# Patient Record
Sex: FEMALE | Race: OTHER | NOT HISPANIC OR LATINO
[De-identification: names, ages, dates, MRNs, and addresses within clinical notes are randomized per-mention and may not be internally consistent; named-entity substitution may affect disease eponyms.]

---

## 2019-05-16 ENCOUNTER — APPOINTMENT (OUTPATIENT)
Dept: OPHTHALMOLOGY | Facility: CLINIC | Age: 69
End: 2019-05-16
Payer: MEDICARE

## 2019-05-16 DIAGNOSIS — H47.092: ICD-10-CM

## 2019-05-16 DIAGNOSIS — H35.372 PUCKERING OF MACULA, LEFT EYE: ICD-10-CM

## 2019-05-16 DIAGNOSIS — H34.8322 TRIBUTARY (BRANCH) RETINAL VEIN OCCLUSION, LEFT EYE, STABLE: ICD-10-CM

## 2019-05-16 DIAGNOSIS — H52.223 REGULAR ASTIGMATISM, BILATERAL: ICD-10-CM

## 2019-05-16 DIAGNOSIS — H25.13 AGE-RELATED NUCLEAR CATARACT, BILATERAL: ICD-10-CM

## 2019-05-16 PROCEDURE — 92004 COMPRE OPH EXAM NEW PT 1/>: CPT

## 2019-05-16 PROCEDURE — 92136 OPHTHALMIC BIOMETRY: CPT

## 2019-05-16 PROCEDURE — 92134 CPTRZ OPH DX IMG PST SGM RTA: CPT

## 2019-05-29 ENCOUNTER — APPOINTMENT (OUTPATIENT)
Dept: OPHTHALMOLOGY | Facility: CLINIC | Age: 69
End: 2019-05-29
Payer: MEDICARE

## 2019-05-29 PROCEDURE — 92136 OPHTHALMIC BIOMETRY: CPT | Mod: NC

## 2019-06-12 ENCOUNTER — APPOINTMENT (OUTPATIENT)
Dept: OPHTHALMOLOGY | Facility: AMBULATORY MEDICAL SERVICES | Age: 69
End: 2019-06-12

## 2019-06-13 ENCOUNTER — APPOINTMENT (OUTPATIENT)
Dept: OPHTHALMOLOGY | Facility: CLINIC | Age: 69
End: 2019-06-13

## 2019-06-18 ENCOUNTER — APPOINTMENT (OUTPATIENT)
Dept: OPHTHALMOLOGY | Facility: CLINIC | Age: 69
End: 2019-06-18

## 2019-08-10 ENCOUNTER — EMERGENCY (EMERGENCY)
Facility: HOSPITAL | Age: 69
LOS: 1 days | Discharge: HOME | End: 2019-08-10
Attending: EMERGENCY MEDICINE
Payer: MEDICARE

## 2019-08-10 VITALS
RESPIRATION RATE: 16 BRPM | SYSTOLIC BLOOD PRESSURE: 150 MMHG | TEMPERATURE: 98 F | DIASTOLIC BLOOD PRESSURE: 82 MMHG | OXYGEN SATURATION: 99 % | HEART RATE: 55 BPM

## 2019-08-10 VITALS
RESPIRATION RATE: 17 BRPM | OXYGEN SATURATION: 99 % | DIASTOLIC BLOOD PRESSURE: 78 MMHG | HEIGHT: 63 IN | TEMPERATURE: 98 F | HEART RATE: 71 BPM | SYSTOLIC BLOOD PRESSURE: 160 MMHG | WEIGHT: 138.01 LBS

## 2019-08-10 PROCEDURE — 99283 EMERGENCY DEPT VISIT LOW MDM: CPT

## 2019-08-10 RX ORDER — DIPHENHYDRAMINE HCL 50 MG
50 CAPSULE ORAL ONCE
Refills: 0 | Status: COMPLETED | OUTPATIENT
Start: 2019-08-10 | End: 2019-08-10

## 2019-08-10 RX ORDER — KETOTIFEN FUMARATE 0.34 MG/ML
1 SOLUTION OPHTHALMIC ONCE
Refills: 0 | Status: COMPLETED | OUTPATIENT
Start: 2019-08-10 | End: 2019-08-10

## 2019-08-10 RX ORDER — MOXIFLOXACIN HCL 0.5 %
1 DROPS OPHTHALMIC (EYE) ONCE
Refills: 0 | Status: COMPLETED | OUTPATIENT
Start: 2019-08-10 | End: 2019-08-10

## 2019-08-10 RX ADMIN — KETOTIFEN FUMARATE 1 DROP(S): 0.34 SOLUTION OPHTHALMIC at 22:10

## 2019-08-10 RX ADMIN — Medication 50 MILLIGRAM(S): at 21:43

## 2019-08-10 RX ADMIN — Medication 1 DROP(S): at 22:10

## 2019-08-10 NOTE — ED PROVIDER NOTE - NSFOLLOWUPCLINICS_GEN_ALL_ED_FT
[facility]  Ophthalmology  [address] [place] [address] [address]  [place] [address]  Phone: [phone]  Fax:   Follow Up Time: 1-3 Days [facility]  Ophthalmology  [address] [place] [address] [address]  [place] [address]  Phone: [phone]  Fax:   Follow Up Time: 1-3 Days

## 2019-08-10 NOTE — ED PROVIDER NOTE - OBJECTIVE STATEMENT
67yo F with PMH HTN, HLD, GERD, L lower visual field loss 2/2 cva, presenting with b/l eye discharge and edema. Pt reports she took her contacts out wrong 4 days ago, had b/l eye pain and pruritis. Went to her optho yesterday and dx with b/l corneal abrasions and rx'ed polytrim drops. Pt reports she used drops 3x and today noted swelling around b/l eyes along with yellow discharge. No new eye pain. No new visual changes. Tried to reach optho today but was unable to so came to ED. Denies fever/chills, throat pain/swelling, n/v, wheezing. No previously known allergies.     Optho Dr. Wolf Blanchard / Dr. Vasquez 67yo F with PMH HTN, HLD, GERD, L lower visual field loss 2/2 cva, b/l cataracts (scheduled for upcoming surgery) presenting with b/l eye discharge and edema. Pt reports she took her contacts out wrong 4 days ago, had b/l eye pain and pruritis. Went to her optho yesterday and dx with b/l corneal abrasions and rx'ed polytrim drops. Pt reports she used drops 3x and today noted swelling around b/l eyes along with yellow discharge. No new eye pain. No new visual changes. Tried to reach optho today but was unable to so came to ED. Denies fever/chills, throat pain/swelling, n/v, wheezing, rash. No previously known allergies.     Optho Dr. Wolf Blanchard / Dr. Vasquez 67yo F with PMH HTN, HLD, GERD, L lower visual field loss 2/2 cva, b/l cataracts (scheduled for upcoming surgery) presenting with b/l eye discharge and edema. Pt reports she took her contacts out wrong 4 days ago, had b/l eye pain and pruritis. Went to her optho yesterday and dx with b/l corneal abrasions and rx'ed polytrim drops. Pt reports she used drops 3x and today noted swelling around b/l eyes along with yellow discharge. No new eye pain. No new visual changes. Tried to reach optho today but was unable to so came to ED. Denies fever/chills, throat pain/swelling, n/v, wheezing, rash. No previously known allergies.        Optho Dr. Wolf Blanchard / Dr. Christina Howell - pt seen in mid19 - agree with above - pt c/o eyelid crusting, redness, swelling and edema around eyes after usind polytrim eye drops Wed. Last used noon today.  + blurred vision with much tearing.  pt received new contact lenses and removed them and had eye pain.  Was seen by her ophtho and rx Abx gtts.

## 2019-08-10 NOTE — ED PROVIDER NOTE - PHYSICAL EXAMINATION
Attn -  alert, bilat eyelid swelling and crusted eyelashes, injected sclera and conjunctiva with sl. chemosis. PERRL 2 mm, visual acuity 20/40 OD and 20/30 OS.  Fundi - no hemorrhage or edema. edema periorbital area.

## 2019-08-10 NOTE — ED PROVIDER NOTE - CLINICAL SUMMARY MEDICAL DECISION MAKING FREE TEXT BOX
Attn - corneal abrasion and allergic rxn to Abx gtts. - stop drops.  Rx Ofloxacin/Moxifloxacin,  ophthal antihistamine.  f/u with her ophtho or [facility] ophtho clinic monday.

## 2019-08-10 NOTE — ED PROVIDER NOTE - BILATERAL EYES
CONJUNCTIVA ERYTHEMA/+ b/l upper and lower eyelid edema; EOMI b/l; PERRL b/l + b/l upper and lower eyelid edema; + minimal pale yellow discharge noted b/l; EOMI b/l; PERRL b/l/CONJUNCTIVA ERYTHEMA

## 2019-08-10 NOTE — ED PROVIDER NOTE - NSFOLLOWUPINSTRUCTIONS_ED_ALL_ED_FT
Ketotifen (ophthalmic): Instill 1 drop into the affected eye(s) twice daily every 8 to 12 hours (maximum: do not exceed 2 applications/day) Take benadryl 25-50mg every 6 hours as needed for eye itching/swelling.     Stop using Polytrim drops.    Start Moxifloxacin eye drops: 1 drop into affected eye(s) 2 times daily for 7 days    Use Ketotifen (ophthalmic) for eye itching: Instill 1 drop into the affected eye(s) twice daily every 8 to 12 hours (maximum: do not exceed 2 applications/day)    Follow up with your ophthalmologist upon discharge.  [facility] information provided.     Return to ER for any new/worsening eye swelling/pain, throat swelling, trouble breathing/swallowing, or any other concerns.

## 2019-08-10 NOTE — ED ADULT TRIAGE NOTE - CHIEF COMPLAINT QUOTE
Patient c/o bilateral eyes redness, burning, discharge and tearing since this am. after taking polymyxin B Sulfate.

## 2019-08-10 NOTE — ED ADULT NURSE NOTE - OBJECTIVE STATEMENT
67 y/o female with PMH HTN, HCL, left eye stroke (2007) residual lower visual field loss, arrives to ED with c/o eye redness, itchiness and swelling eyelids. Patient reports she was diagnosed with corneal BL after using eye contacts, was prescribed polymixin B drops, took 3 drops total over course of two days and woke up with symptoms today. Patient states "I am having an allergic reaction to eye drops". States she read the literature on back of prescription and is experiencing side effects. Patient has no new visual changes, PERRLA 3mm BL. No headache, dizziness, shortness of breath, chest pain, abdomen pain, n/v/d. No fever/chills or urinary symptoms. Patient states she was itching area, could have caused increased inflammation to eyelid. Patient also stating having yellowish discharge from BL eyes.

## 2019-08-10 NOTE — ED ADULT NURSE NOTE - NSIMPLEMENTINTERV_GEN_ALL_ED
Implemented All Universal Safety Interventions:  [place] to call system. Call bell, personal items and telephone within reach. Instruct patient to call for assistance. Room bathroom lighting operational. Non-slip footwear when patient is off stretcher. Physically safe environment: no spills, clutter or unnecessary equipment. Stretcher in lowest position, wheels locked, appropriate side rails in place.

## 2020-11-04 ENCOUNTER — RESULT REVIEW (OUTPATIENT)
Age: 70
End: 2020-11-04

## 2021-08-03 DIAGNOSIS — R92.2 INCONCLUSIVE MAMMOGRAM: ICD-10-CM

## 2021-11-05 ENCOUNTER — APPOINTMENT (OUTPATIENT)
Dept: OBGYN | Facility: CLINIC | Age: 71
End: 2021-11-05
Payer: MEDICARE

## 2021-11-05 VITALS
WEIGHT: 145 LBS | DIASTOLIC BLOOD PRESSURE: 82 MMHG | HEIGHT: 62 IN | BODY MASS INDEX: 26.68 KG/M2 | SYSTOLIC BLOOD PRESSURE: 120 MMHG

## 2021-11-05 PROCEDURE — G0101: CPT | Mod: GA

## 2021-11-05 PROCEDURE — G0328 FECAL BLOOD SCRN IMMUNOASSAY: CPT | Mod: QW

## 2022-11-08 ENCOUNTER — APPOINTMENT (OUTPATIENT)
Dept: OBGYN | Facility: CLINIC | Age: 72
End: 2022-11-08

## 2022-11-08 VITALS
DIASTOLIC BLOOD PRESSURE: 83 MMHG | WEIGHT: 150 LBS | SYSTOLIC BLOOD PRESSURE: 128 MMHG | BODY MASS INDEX: 27.6 KG/M2 | HEIGHT: 62 IN

## 2022-11-08 DIAGNOSIS — E28.39 OTHER PRIMARY OVARIAN FAILURE: ICD-10-CM

## 2022-11-08 DIAGNOSIS — N90.5 ATROPHY OF VULVA: ICD-10-CM

## 2022-11-08 DIAGNOSIS — Z01.411 ENCOUNTER FOR GYNECOLOGICAL EXAMINATION (GENERAL) (ROUTINE) WITH ABNORMAL FINDINGS: ICD-10-CM

## 2022-11-08 DIAGNOSIS — M81.0 AGE-RELATED OSTEOPOROSIS W/OUT CURRENT PATHOLOGICAL FRACTURE: ICD-10-CM

## 2022-11-08 PROCEDURE — G0101: CPT

## 2022-11-08 PROCEDURE — G0328 FECAL BLOOD SCRN IMMUNOASSAY: CPT | Mod: QW

## 2022-11-10 LAB — HPV HIGH+LOW RISK DNA PNL CVX: NOT DETECTED

## 2022-11-14 LAB — CYTOLOGY CVX/VAG DOC THIN PREP: ABNORMAL

## 2023-06-27 DIAGNOSIS — Z12.39 ENCOUNTER FOR OTHER SCREENING FOR MALIGNANT NEOPLASM OF BREAST: ICD-10-CM

## 2023-11-16 ENCOUNTER — APPOINTMENT (OUTPATIENT)
Dept: OBGYN | Facility: CLINIC | Age: 73
End: 2023-11-16
Payer: MEDICARE

## 2023-11-16 VITALS
BODY MASS INDEX: 26.87 KG/M2 | DIASTOLIC BLOOD PRESSURE: 77 MMHG | HEIGHT: 62 IN | SYSTOLIC BLOOD PRESSURE: 130 MMHG | WEIGHT: 146 LBS

## 2023-11-16 PROCEDURE — G0328 FECAL BLOOD SCRN IMMUNOASSAY: CPT | Mod: GA,QW

## 2023-11-16 PROCEDURE — G0101: CPT | Mod: GA

## 2024-07-29 DIAGNOSIS — Z12.31 ENCOUNTER FOR SCREENING MAMMOGRAM FOR MALIGNANT NEOPLASM OF BREAST: ICD-10-CM

## 2024-12-06 ENCOUNTER — APPOINTMENT (OUTPATIENT)
Dept: OBGYN | Facility: CLINIC | Age: 74
End: 2024-12-06

## 2024-12-06 ENCOUNTER — NON-APPOINTMENT (OUTPATIENT)
Age: 74
End: 2024-12-06

## 2024-12-06 VITALS
DIASTOLIC BLOOD PRESSURE: 74 MMHG | HEIGHT: 62 IN | SYSTOLIC BLOOD PRESSURE: 133 MMHG | BODY MASS INDEX: 27.6 KG/M2 | WEIGHT: 150 LBS

## 2024-12-06 DIAGNOSIS — E28.39 OTHER PRIMARY OVARIAN FAILURE: ICD-10-CM

## 2024-12-06 DIAGNOSIS — Z13.31 ENCOUNTER FOR SCREENING FOR DEPRESSION: ICD-10-CM

## 2024-12-06 DIAGNOSIS — N90.5 ATROPHY OF VULVA: ICD-10-CM

## 2024-12-06 DIAGNOSIS — Z01.411 ENCOUNTER FOR GYNECOLOGICAL EXAMINATION (GENERAL) (ROUTINE) WITH ABNORMAL FINDINGS: ICD-10-CM

## 2024-12-06 DIAGNOSIS — R92.30 DENSE BREASTS, UNSPECIFIED: ICD-10-CM

## 2024-12-06 PROCEDURE — G0444 DEPRESSION SCREEN ANNUAL: CPT

## 2024-12-06 PROCEDURE — G0101: CPT

## 2024-12-06 PROCEDURE — G0328 FECAL BLOOD SCRN IMMUNOASSAY: CPT | Mod: QW

## 2024-12-11 LAB — HPV HIGH+LOW RISK DNA PNL CVX: NOT DETECTED

## 2024-12-12 LAB — CYTOLOGY CVX/VAG DOC THIN PREP: ABNORMAL
